# Patient Record
(demographics unavailable — no encounter records)

---

## 2018-03-09 NOTE — RAD
CHEST 2 VIEWS:

 

HISTORY: 

Cough.

 

COMPARISON: 

3/6/18 study.

 

FINDINGS: 

Heart size is within normal limits.  There are atherosclerotic changes of the aorta.  The lungs are c
lear of any infiltrative process.  Bones appear demineralized.

 

IMPRESSION: 

No active intrathoracic disease.  Stable chest.

 

POS: [facility]

## 2019-01-16 NOTE — RAD
SINGLE VIEW CHEST:

 

HISTORY:

Elevated blood pressure and mid sternal chest pain.

 

COMPARISON:

03/09/2018

 

FINDINGS:

Single view of the chest show normal sized cardiomediastinal silhouette. There is no evidence of cons
olidation, mass, or pleural effusion. The bones are unremarkable.

 

IMPRESSION:

No evidence of acute cardiopulmonary disease.

 

POS: [facility]

## 2019-01-19 NOTE — EKG
Test Reason : 

Blood Pressure : ***/*** mmHG

Vent. Rate : 081 BPM     Atrial Rate : 081 BPM

   P-R Int : 134 ms          QRS Dur : 078 ms

    QT Int : 364 ms       P-R-T Axes : 084 000 020 degrees

   QTc Int : 422 ms

 

Sinus rhythm with Premature atrial complexes

Otherwise normal ECG

 

Confirmed by RITA SWARTZ DO ([id number]),  MITALI SALINAS ([id number]) on 1/19/2019 10:59:15 AM

 

Referred By:             Confirmed By:RITA SWARTZ DO

## 2020-01-20 NOTE — CT
CT OF THE SOFT TISSUES OF THE NECK:

 

Indication: History of right sided abscess. 

 

Comparison: None. 

 

FINDINGS: 

The native lenses have been replaced. The visualized intracranial contents are unremarkable appearing
. 

 

There is a peripherally enhancing loculated fluid collection seen involving the inferior aspect of th
e right  muscle measuring 2.6 x 1.9 x 1.8 cm, consistent with an abscess. This is causing m
ass effect on the underlying right submandibular gland with thickening of the overlying right platysm
a. There is some mild edema seen within the right  space. There is shotty appearing mildly 
prominent lymph nodes within the right  space. 

 

There is dental amalgam that limits visualization of the oral cavity. There is a posterior right ara
ibular metallic post without significant surrounding periprosthetic lucency to suggest presence of in
fection. No significant periodontal disease is evident. 

 

Visualized aortodigestric tract appears within normal limits. Small hypodensities involving the left 
thyroid gland. Left submandibular and both parotid glands are normal appearing. 

 

Lung apices are clear. No pathologically enlarged lymph nodes are evident within the upper mediastinu
m. 

 

Scattered degenerative and osteoarthritic change. 

 

IMPRESSION: 

1. Right  space abscess with surrounding inflammatory phlegmon and inflammation. The source
 of the infection is difficult to determine on the current exam. 

2. Right facial and right neck cellulitis. 

3. ENT consultation is recommended. 

 

POS: [facility]

## 2020-01-21 NOTE — PDOC.HOSPP
- Subjective


Encounter Date: 01/21/20


Encounter Time: 08:45


Subjective: 





Patient seen and examined. No new complaints. No overnight events





- Objective


Vital Signs & Weight: 


 Vital Signs (12 hours)











  Temp Pulse Resp BP Pulse Ox


 


 01/21/20 08:11  98.9 F  73  18  119/67  96


 


 01/21/20 08:00      96


 


 01/21/20 04:51  99.6 F  84  18  147/74 H  92 L


 


 01/20/20 22:40  97.8 F  84  18  177/99 H  96








 Weight











Weight                         138 lb 4.8 oz














I&O: 


 











 01/20/20 01/21/20 01/22/20





 06:59 06:59 06:59


 


Intake Total  1040 


 


Balance  1040 











Result Diagrams: 


 01/21/20 04:48





 01/21/20 04:48


Radiology Reviewed by me: Yes





Hospitalist ROS





- Review of Systems


Constitutional: denies: fever, chills, sweats, weakness, malaise, other


Eyes: denies: pain, vision change, conjunctivae inflammation, eyelid 

inflammation, redness, other


ENT: reports: throat pain, throat swelling.  denies: ear pain, ear discharge, 

nose pain, nose discharge, nose congestion, mouth pain, mouth swelling, other


Respiratory: denies: cough, dry, shortness of breath, hemoptysis, SOB with 

excertion, pleuritic pain, sputum, wheezing, other


Cardiovascular: denies: chest pain, palpitations, orthopnea, paroxysmal noc. 

dyspnea, edema, light headedness, other


Gastrointestinal: denies: nausea, vomiting, abdominal pain, diarrhea, 

constipation, melena, hematochezia, other


Genitourinary: denies: dysuria, frequency, incontinence, hematuria, retention, 

other


Musculoskeletal: reports: neck pain


Skin: denies: rash, lesions, chelita, bruising, other





- Medication


Medications: 


Active Medications











Generic Name Dose Route Start Last Admin





  Trade Name Freq  PRN Reason Stop Dose Admin


 


Acetaminophen  650 mg  01/20/20 21:21  01/21/20 05:20





  Tylenol  VT   650 mg





  Q4H PRN   Administration





  Headache/Fever/Mild Pain (1-3)   





     





     





     


 


Famotidine  20 mg  01/21/20 09:00  01/21/20 08:20





  Pepcid  PO   Not Given





  BID MUSTAPHA   





     





     





     





     


 


Clindamycin Phosphate/Dextrose  50 mls @ 100 mls/hr  01/21/20 06:00  01/21/20 05

:16





  900 mg/ Device  IVPB   50 mls





  Q8HR MUSTAPHA   Administration





     





     





     





     


 


Sodium Chloride  1,000 mls @ 70 mls/hr  01/20/20 21:30  01/20/20 23:45





  Normal Saline 0.9%  IV   1,000 mls





  [id number] MUSTAPHA   Administration





     





     





     





     


 


Nebivolol  2.5 mg  01/21/20 09:00  01/21/20 08:20





  Bystolic  PO   Not Given





  DAILY MUSTAPHA   





     





     





     





     


 


Sodium Chloride  10 ml  01/21/20 09:00  01/21/20 08:22





  Flush - Normal Saline  IVF   Not Given





  Q12HR MUSTAPHA   





     





     





     





     














- Exam


General Appearance: NAD, awake alert


Eye: PERRL, anicteric sclera


ENT: normocephalic atraumatic, no oropharyngeal lesions


Neck: supple, symmetric, no JVD, no thyromegaly


Neck - other findings: right side of neck swelling, tender, warmth


Heart: RRR, no murmur, no gallops, no rubs


Respiratory: CTAB, no wheezes, no rales, no ronchi


Gastrointestinal: soft, non-tender, non-distended, normal bowel sounds


Extremities: no cyanosis, no clubbing, no edema


Skin: normal turgor, no lesions


Neurological: no focal deficits


Musculoskeletal: normal tone, normal strength


Psychiatric: normal affect, normal behavior





Hosp A/P


(1) Sepsis


Code(s): A41.9 - SEPSIS, UNSPECIFIED ORGANISM   Status: Acute   


Qualifiers: 


   Sepsis type: sepsis due to unspecified organism   Sepsis acute organ 

dysfunction status: without acute organ dysfunction   Qualified Code(s): A41.9 

- Sepsis, unspecified organism   





(2) Neck abscess


Code(s): L02.11 - CUTANEOUS ABSCESS OF NECK   Status: Acute   





(3) Hypertension


Code(s): I10 - ESSENTIAL (PRIMARY) HYPERTENSION   Status: Chronic   





- Plan


old records reviewed/req, continue antibiotics





1/21/20


suspecting origin of abscess from tooth problem, oral surgery consulted for 

their opinion


warm compress application over affected area


continue IV antibiotics


Medication reviewed and continue to provide symptomatic care and supportive care


follow culture


pain control with pain medication

## 2020-01-22 NOTE — CON
DATE OF CONSULTATION:  01/21/2020



CONSULTING PHYSICIAN:  Dr. Guerrero with the hospitalist.



CHIEF COMPLAINT:  Right-sided neck pain.



HISTORY OF PRESENT ILLNESS:  An 82-year-old female, who states that a couple of days

prior, she noticed a lump on the right side of her neck positioned along the area of

one of her superficial neck veins.  She reports that also small localized lump then

turned into a larger swelling and progressed to its current state.  As the swelling

got worse, the patient started experiencing difficulty and discomfort with opening

her mouth chewing food, and she states that she noted a fever of approximately 102

degrees.  She subsequently presented to the emergency room and on evaluation was

found to have a right-sided deep neck space abscess.  I was consulted for evaluation

and management. 



REVIEW OF SYSTEMS:  Pain and swelling of the right neck, difficulty opening her

mouth, discomfort on swallowing.  Otherwise, review of systems is negative. 



PAST MEDICAL HISTORY:  Dyslipidemia, hypertension, osteopenia, Meniere disease.



PAST SURGICAL HISTORY:  Incision and drainage of Bartholin cyst, hysterectomy.



HOME MEDICATIONS:  

1. Bystolic.

2. Simethicone.



ALLERGIES:  PENICILLIN AND CEPHALEXIN.



SOCIAL HISTORY:  Denies tobacco, alcohol, or recreational drugs.



PHYSICAL EXAMINATION:

VITAL SIGNS:  Blood pressure 137/82, pulse 72, respiratory rate is 18, 95% oxygen on

room air, temperature 98.2. 

GENERAL:  Alert and oriented x3.  No apparent distress. 

HEAD AND NECK:  The patient has significant right-sided neck swelling, which is

indurated and erythematous.  She is uncomfortable to palpation, but not

significantly so.  Due to the degree of the induration, I am unable to palpate any

fluctuance.  This swelling extends from the right submandibular down along the

anterior lateral neck approaching the clavicular region.  Maximum incisal opening is

approximately 20 mm.  There is no noted swelling or erythema intraorally with the

buccal vestibule and the lingual vestibule appearing relatively within normal

limits.  The patient has an implant in the tooth #30 site, which has some exposed

aspects facially; however, no signs of active acute infection in the area.  None of

the teeth on the right mandibular area are mobile and there are no signs of gross

caries. 



LABORATORY STUDIES:  Hematology shows a white blood cell count of 12, down from 12.6

yesterday.  Platelet count 329, hemoglobin 11.9 with normal neutrophils, low

lymphocytes. 



Chemistry studies show a glucose of 115, otherwise within normal limits. 



CT scan of the neck shows a large multiloculated fluid collection in the fairly

inferior aspect of the right submandibular space region.  There is no clear tracking

or continuation of this abscess up into the area of the right mandible and

dentition.  The patient does have some surrounding cellulitis and potentially some

phlegmon. 



ASSESSMENT:  Right deep neck space abscess without a clear identification of source.

 This abscess may represent a dentoalveolar infection.  However, based on the

clinical and radiographic exam, it seems questionable.  Additionally, based on the

patient's report of development and progression of this abscess, also calls into

question whether or not there is a dentoalveolar source. 



PLAN:  

1. The patient will be kept n.p.o. and will be taken to the operating room later

today for incision and drainage of the right submandibular abscess.  While the

patient is under general anesthetic, further exploration of the dentition of the

right mandible will be entertained in any teeth or implants that show any signs of

concern for 

potential cause of the deep neck abscess.  Those potential etiologies will be

removed. 

2. Continue IV antibiotics and add Peridex oral rinses.  The patient was consented

for the above-mentioned procedure. 







Job ID:  [id number]

## 2020-01-22 NOTE — PDOC.HOSPP
- Subjective


Subjective: 





Seen and examined. Dressing of the neck is clean, dry, and intact. Preliminary 

cultures was Staphylococcus, sensitivity pending. Patient states that pain and 

swelling has significantly improved and she is able the chew and swallow now 

without pain. Time was given for questions, all answered in detail.





- Objective


Vital Signs & Weight: 


 Vital Signs (12 hours)











  Temp Pulse Resp BP Pulse Ox


 


 01/22/20 15:22  97.6 F  64  16  133/70  96


 


 01/22/20 11:17  97.6 F  64  16  128/67  94 L


 


 01/22/20 07:23  97.8 F  74  17  137/80  98








 Weight











Weight                         138 lb 4.8 oz














I&O: 


 











 01/21/20 01/22/20 01/23/20





 06:59 06:59 06:59


 


Intake Total 1040 900 880


 


Balance 1040 900 880











Result Diagrams: 


 01/22/20 05:47





 01/21/20 04:48


Radiology Reviewed by me: Yes





Hospitalist ROS





- Review of Systems


All other systems reviewed; all pertinent +/- noted in HPI/Subj





- Medication


Medications: 


Active Medications











Generic Name Dose Route Start Last Admin





  Trade Name Freq  PRN Reason Stop Dose Admin


 


Acetaminophen  650 mg  01/20/20 21:21  01/21/20 05:20





  Tylenol  NM   650 mg





  Q4H PRN   Administration





  Headache/Fever/Mild Pain (1-3)   





     





     





     


 


Famotidine  20 mg  01/21/20 09:00  01/22/20 08:06





  Pepcid  PO   20 mg





  BID MUSTAPHA   Administration





     





     





     





     


 


Clindamycin Phosphate/Dextrose  50 mls @ 100 mls/hr  01/21/20 06:00  01/22/20 13

:23





  900 mg/ Device  IVPB   50 mls





  Q8HR MUSTAPHA   Administration





     





     





     





     


 


Sodium Chloride  10 ml  01/21/20 09:00  01/22/20 08:07





  Flush - Normal Saline  IVF   10 ml





  Q12HR MUSTAPHA   Administration





     





     





     





     














- Exam


General Appearance: NAD, awake alert


Eye: anicteric sclera


ENT: normocephalic atraumatic, moist mucosa


Neck: supple, symmetric, no lymphadenopathy


Neck - other findings: Dressing clean, dry, and intact


Heart: diminshed peripheral pulses


Respiratory: CTAB, no wheezes, no rales, no ronchi, no tachypnea


Gastrointestinal: soft, non-tender, non-distended, no guarding, no rigidity


Extremities: no edema


Skin: no lesions, no rashes


Neurological: cranial nerve grossly intact, no focal deficits


Musculoskeletal: generalized weakness


Psychiatric: normal affect, normal behavior, A&O x 3





Hosp A/P


(1) Neck abscess


Code(s): L02.11 - CUTANEOUS ABSCESS OF NECK   Status: Acute   





(2) Sepsis


Code(s): A41.9 - SEPSIS, UNSPECIFIED ORGANISM   Status: Acute   


Qualifiers: 


   Sepsis type: sepsis due to unspecified organism   Sepsis acute organ 

dysfunction status: without acute organ dysfunction   Qualified Code(s): A41.9 

- Sepsis, unspecified organism   





(3) Physical deconditioning


Code(s): R53.81 - OTHER MALAISE   Status: Acute   





(4) Hypertension


Code(s): I10 - ESSENTIAL (PRIMARY) HYPERTENSION   Status: Chronic   





(5) Menieres disease


Code(s): H81.09 - MENIERE'S DISEASE, UNSPECIFIED EAR   Status: Chronic   





- Plan





Plan:


medical/surgical unit


ENT consultation, recommendations appreciated


status post incision and drainage by Dr. Molina on 1/21/2020


postoperative care


IV antibiotics


intraoperative cultures preliminary for Staphylococcus, de-escalate to culture 

and sensitivity as able


blood pressure control 


blood sugar control


continual home medications as able


G.I. prophylaxis


DVT prophylaxis

## 2020-01-22 NOTE — OP
DATE OF PROCEDURE:  01/21/2020



PREOPERATIVE DIAGNOSIS:  Right submandibular space abscess.



POSTOPERATIVE DIAGNOSIS:  Right submandibular space abscess.



PROCEDURE PERFORMED:  Transcervical incision and drainage of right submandibular

abscess. 



INDICATIONS FOR PROCEDURE:  This 82-year-old female with recent acute onset of right

neck swelling, induration and pain.  On presentation to the emergency room, the

patient was found to have a large multiloculated abscess of the right neck and was

admitted for initiation of IV antibiotics.  I was consulted for surgical management

of the abscess and the patient was brought to the operating room at this time for

incision and drainage of the neck abscess. 



DESCRIPTION OF PROCEDURE:  The patient was identified in the preoperative holding

and all questions were answered.  She was subsequently transferred to the operating

room and transferred to the operating room table in a supine position.  She was

subsequently intubated via the oral route by the anesthesia service without

complication.  A surgical time-out was performed. 



The face and neck were prepped and draped in a sterile manner and a skin marker was

used to heri the level of the planned incision in the right neck.  This incision was

approximately 3-4 cm inferior to the inferior border of the mandible.  After marking

the incision, lidocaine with epinephrine was infiltrated throughout the area of the

planned approach.  Subsequently, a 15 blade was used to make a skin incision.

Hemostats were then used to bluntly dissect through the subcutaneous tissue through

the platysma and up toward the area of the abscess.  Very soon after a blunt

dissection began, the abscess was entered bluntly using the hemostats and the

hemostats were spread to decompress the abscess.  Significant amounts of dat

purulence were obtained and a culture swab was taken and sent for cultures.  After

this, finger dissection then ensued to break up all loculations and further

completely decompressed the abscess.  This was done without problem and it was noted

on finger dissection that the abscess did not proceed fully up into the area of the

perimandibular region, although it did continue just to the inferior border region.

After complete decompression of the abscess, the wound was copiously irrigated with

bacitracin infused normal saline.  During the finger dissection and irrigation,

multiple pieces of possibly necrotic tissue were retrieved from the cavity, and 2 of

these pieces were sent for pathology.  After decompression and irrigation, attention

was turned intraorally and the dentition of the right mandible was evaluated.  As

was felt on the preoperative exam, there was no signs of inflammation, swelling, or

infection intraorally with the tissues of the buccal vestibule and lingual vestibule

completely normal and unaffected relative to the tissues of the right neck.

Additionally, the implant in the tooth #30 site was noted to have some exposed

aspects facially, but there are no signs of acute infection in the area.  All teeth

in the right mandible were nonmobile and without significant probing depths or

obvious signs of pathology.  It was decided at this time that no teeth or implants

will be removed at this point.  The oral cavity was suctioned free of secretions.

Attention was turned back to the neck.  The neck wound was irrigated once more with

a bacitracin infused saline and then a quarter-inch Penrose drain was doubled over

on itself and introduced into the abscess cavity.  This drain was tacked to the skin

using a 2-0 nylon drain stitch and it was cut to length.  The face and neck were

cleaned of the prep solution and ABD pad as well as a Kerlix fluffs were placed over

the neck wound and taped securely.  The patient was turned over to Anesthesia for

emergence and extubation, which ensued without complication. 



INTRAVENOUS FLUIDS:  Please see anesthetic record.



ESTIMATED BLOOD LOSS:  15 mL.



SPECIMENS:  Purulence and tissue from the right neck abscess region.



DRAINS:  Quarter-inch Penrose to the right submandibular space.



IMPLANTS:  None.



COMPLICATIONS:  None.



FINDINGS:  Significant thick purulence from the right deep neck and submandibular

regions.  Multiple pieces of what appeared to be possible necrotic soft tissue

retrieved from the area of the right neck abscess.  No obvious signs of dental

pathology or infection. 



DISPOSITION:  The patient tolerated the procedure well and she was transferred to

the recovery room in good condition. 







Job ID:  [id number]

## 2020-01-23 NOTE — PDOC.HOSPP
- Subjective


Subjective: 





Seen and examined. Doing well this AM. Bandage clean and dry, was replaced this 

AM. Breathing well on room air. Microbiology reviewed - pan sensitive s. aureus

, will transition to orals on D/c.





- Objective


Vital Signs & Weight: 


 Vital Signs (12 hours)











  Temp Pulse Resp BP Pulse Ox


 


 01/23/20 08:00  97.8 F  98  16  150/80 H  96


 


 01/23/20 02:56  98.2 F  79  16  151/78 H  97








 Weight











Weight                         138 lb 4.8 oz














I&O: 


 











 01/22/20 01/23/20 01/24/20





 06:59 06:59 06:59


 


Intake Total 900 1970 


 


Balance 900 1970 











Result Diagrams: 


 01/22/20 05:47





 01/21/20 04:48


Radiology Reviewed by me: Yes





Hospitalist ROS





- Review of Systems


All other systems reviewed; all pertinent +/- noted in HPI/Subj





- Medication


Medications: 


Active Medications











Generic Name Dose Route Start Last Admin





  Trade Name Freq  PRN Reason Stop Dose Admin


 


Acetaminophen  650 mg  01/20/20 21:21  01/21/20 05:20





  Tylenol  OK   650 mg





  Q4H PRN   Administration





  Headache/Fever/Mild Pain (1-3)   





     





     





     


 


Famotidine  20 mg  01/21/20 09:00  01/23/20 09:37





  Pepcid  PO   20 mg





  BID MUSTAPHA   Administration





     





     





     





     


 


Clindamycin Phosphate/Dextrose  50 mls @ 100 mls/hr  01/21/20 06:00  01/23/20 05

:31





  900 mg/ Device  IVPB   50 mls





  Q8HR MUSTAPHA   Administration





     





     





     





     


 


Nebivolol  5 mg  01/23/20 09:00  01/23/20 09:37





  Bystolic  PO   5 mg





  DAILY MUSTAPHA   Administration





     





     





     





     


 


Sodium Chloride  10 ml  01/21/20 09:00  01/22/20 21:26





  Flush - Normal Saline  IVF   10 ml





  Q12HR MUSTAPHA   Administration





     





     





     





     














- Exam


General Appearance: NAD, awake alert


Eye: PERRL, anicteric sclera


ENT: normocephalic atraumatic, moist mucosa


Neck: supple, symmetric, no lymphadenopathy


Neck - other findings: Right neck dressing clean and dry. 


Heart: no murmur, no gallops, no rubs


Respiratory: CTAB, no wheezes, no rales, no ronchi


Gastrointestinal: soft, non-tender, no guarding, no rigidity


Extremities: no edema


Skin: no rashes


Neurological: cranial nerve grossly intact, no focal deficits


Musculoskeletal: generalized weakness


Psychiatric: normal affect, normal behavior, A&O x 3





Hosp A/P


(1) Neck abscess


Code(s): L02.11 - CUTANEOUS ABSCESS OF NECK   Status: Acute   





(2) Sepsis


Code(s): A41.9 - SEPSIS, UNSPECIFIED ORGANISM   Status: Acute   


Qualifiers: 


   Sepsis type: sepsis due to unspecified organism   Sepsis acute organ 

dysfunction status: without acute organ dysfunction   Qualified Code(s): A41.9 

- Sepsis, unspecified organism   





(3) Physical deconditioning


Code(s): R53.81 - OTHER MALAISE   Status: Acute   





(4) Hypertension


Code(s): I10 - ESSENTIAL (PRIMARY) HYPERTENSION   Status: Chronic   





(5) Menieres disease


Code(s): H81.09 - MENIERE'S DISEASE, UNSPECIFIED EAR   Status: Chronic   





- Plan





Plan:


medical/surgical unit


ENT consultation, recommendations appreciated


status post incision and drainage by Dr. Molina on 1/21/2020


postoperative care


IV antibiotics


intraoperative cultures with Staphylococcus, pan sensitivity - Will transition 

or oral meds on D/c


PT/OT eval and treat


blood pressure control 


blood sugar control


continual home medications as able


G.I. prophylaxis


DVT prophylaxis

## 2020-01-25 NOTE — DIS
DATE OF ADMISSION:  01/20/2020



DATE OF DISCHARGE:  01/25/2020



REASON FOR HOSPITALIZATION:  Neck swelling and pain.



SIGNIFICANT FINDINGS:  The patient was found to have abscess on the right side of

her neck growing Staphylococcus aureus. 



PROCEDURES PERFORMED AND TREATMENTS RENDERED:  The patient was admitted to medical

unit on 01/20/2020, for close management after she was identified to have right neck

abscess.  The patient was seen and evaluated by oral maxillofacial surgeon, Dr. Molina-please see full consultation notes and progress notes in addition to

operative reports for full details.  The patient was taken to the operating room on

01/21/2020, by Dr. Molina-please see full operative report for details.  The

patient tolerated the procedure well without intraoperative complications.

Intraoperative cultures were obtained from the right submandibular abscess and these

cultures were identified to be growing Staphylococcus aureus.  The patient was

empirically placed on IV antibiotics on admission with clindamycin, which was found

to be sensitive to.  The patient continued IV clindamycin while she was

hospitalized.  The patient recommended safe for discharge by surgery on 01/25/2020.

I helped set up the patient with home health care with the assistance of Case

Management, so that she can have RN to assist with wound dressing changes and

physical therapy to be continued.  The patient remaining afebrile since admission.

WBC count normalized.  The patient is recommended safe for discharge on oral

antibiotics with close followup with surgery in the outpatient setting. 



CONDITION ON DISCHARGE:  Stable.



SPECIFIC INSTRUCTIONS FOR THE PATIENT/FAMILY:  

1. The patient is recommended to follow up with oral maxillofacial surgeon, Dr. Molina in the outpatient setting in the next 2 to 3 days. 

2. The patient is recommended to follow up with primary care physician in the next 5

to 7 days. 

3. The patient is recommended to complete a full course of oral antibiotics.

4. The patient is recommended to call to take all other home medications as directed.

5. The patient is recommended to return to acute care hospital immediately if signs

or symptoms return, worsen, or any other new symptoms occur. 



DISCHARGE MEDICATIONS:  

1. Clindamycin 300 mg p.o. q.6 hours for 7 days, 28 tablets.

2. Acidophilus bulgaricus 1 g p.o. daily.

3. Famotidine 20 mg p.o. b.i.d.

4. Simethicone 125 mg four times per day p.r.n. gas pain.

5. Bystolic 5 mg p.o. at bedtime.



TIME SPENT:  greater than 36 minutes spent coordinating care and discharge process

for this patient. 







Job ID:  [id number]

## 2020-01-27 NOTE — PQF
[employer] Crystal Reports Winform Viewer

[facility]LE[facility]

[id number]                                                             [id number]

[id number]                             

                                   

CLINICAL DOCUMENTATION CLARIFICATION FORM:  POST DISCHARGE



Addendum to original discharge summary date:  __________________________________
____



Late entry note date:  _________________________________________________________
__











DATE: 1/27/20                                                   ATTN:Mark Gould







Please exercise your independent, professional judgment in responding to the 
clarification form. 

Clinical indicators are provided on the bottom of this form for your review





Can you please further clarify if Sepsis is ruled in or ruled out?



Sepsis

[  XX ] Ruled in diagnosis

     [  ] Continue to treat        [  ] Resolved

[  ] Ruled out diagnosis

[  ] Cannot rule out diagnosis

[  ] Other diagnosis ___________

[  ] Unable to determine



In addition, please specify:

Present on Admission (POA):  [ XX  ] Yes             [  ] No             [  ] 
Unable to determine



For continuity of documentation, please document condition throughout progress 
notes and discharge summary.  Thank You.



CLINICAL INDICATORS - SIGNS / SYMPTOMS / LABS

Consult pg.1- found to have a right sided deep neck  space abscess

H and P pg.2- Sepsis He had temperature of 103 degrees Fahrenheit

H and P pg.2- she has leukocytosis and source of infection  in the neck

Hospitalist PM pg.4- suspecting origin of abscess from tooth problem

Hospitalist PN pg.1- Preliminary cultures was staphylococcus

Hospitalist PN 1/24 pg.4- Sepsis due to unspecified organism

DS pg.1- Significant findings:  abscess on the right side of neck growing 
staphylococcus aureus





RISK FACTORS

Neck abscess- H and P pg.2

Hypertensive urgency- H and P pg.2

Submandibular space abscess







TREATMENTS

I and D of right submandibular abscess- OP report pg.1

IV antibiotics- MAR

IV fluids- MAR

Dental Consult- Dr. Luna 1/21

Soft Tissue Neck CT 1/20









(This form is maintained as a part of the permanent medical record)

2015 [email or web address].  All Rights Reserved

[email or web address]    [phone]

                                                              



WILLIAM